# Patient Record
Sex: MALE | Race: WHITE | NOT HISPANIC OR LATINO | Employment: OTHER | URBAN - METROPOLITAN AREA
[De-identification: names, ages, dates, MRNs, and addresses within clinical notes are randomized per-mention and may not be internally consistent; named-entity substitution may affect disease eponyms.]

---

## 2019-10-28 ENCOUNTER — OFFICE VISIT (OUTPATIENT)
Dept: UROLOGY | Facility: CLINIC | Age: 79
End: 2019-10-28
Payer: MEDICARE

## 2019-10-28 VITALS
HEIGHT: 72 IN | WEIGHT: 200 LBS | HEART RATE: 79 BPM | DIASTOLIC BLOOD PRESSURE: 76 MMHG | RESPIRATION RATE: 18 BRPM | BODY MASS INDEX: 27.09 KG/M2 | SYSTOLIC BLOOD PRESSURE: 118 MMHG

## 2019-10-28 DIAGNOSIS — N40.1 BPH WITH URINARY OBSTRUCTION: Primary | ICD-10-CM

## 2019-10-28 DIAGNOSIS — N13.8 BPH WITH URINARY OBSTRUCTION: Primary | ICD-10-CM

## 2019-10-28 PROCEDURE — 51798 US URINE CAPACITY MEASURE: CPT | Mod: S$GLB,,, | Performed by: NURSE PRACTITIONER

## 2019-10-28 PROCEDURE — 99213 PR OFFICE/OUTPT VISIT, EST, LEVL III, 20-29 MIN: ICD-10-PCS | Mod: 25,S$GLB,, | Performed by: NURSE PRACTITIONER

## 2019-10-28 PROCEDURE — 99213 OFFICE O/P EST LOW 20 MIN: CPT | Mod: 25,S$GLB,, | Performed by: NURSE PRACTITIONER

## 2019-10-28 PROCEDURE — 51798 POCT BLADDER SCAN: ICD-10-PCS | Mod: S$GLB,,, | Performed by: NURSE PRACTITIONER

## 2019-10-28 RX ORDER — CALCIUM CARBONATE 600 MG
600 TABLET ORAL ONCE
COMMUNITY

## 2019-10-28 RX ORDER — MULTIVITAMIN
1 TABLET ORAL DAILY
COMMUNITY

## 2019-10-28 RX ORDER — TERAZOSIN 5 MG/1
CAPSULE ORAL
COMMUNITY
Start: 2019-09-19

## 2019-10-28 RX ORDER — METOPROLOL SUCCINATE 50 MG/1
50 TABLET, EXTENDED RELEASE ORAL DAILY
Refills: 6 | COMMUNITY
Start: 2019-10-02 | End: 2022-03-29

## 2019-10-28 RX ORDER — KETOCONAZOLE 20 MG/ML
SHAMPOO, SUSPENSION TOPICAL
COMMUNITY
Start: 2019-09-09

## 2019-10-28 RX ORDER — VALACYCLOVIR HYDROCHLORIDE 1 G/1
1000 TABLET, FILM COATED ORAL 3 TIMES DAILY
Refills: 0 | COMMUNITY
Start: 2019-09-16

## 2019-10-28 RX ORDER — PREDNISOLONE ACETATE 10 MG/ML
SUSPENSION/ DROPS OPHTHALMIC
Refills: 6 | COMMUNITY
Start: 2019-10-03 | End: 2023-07-11

## 2019-10-28 RX ORDER — DIPHENOXYLATE HYDROCHLORIDE AND ATROPINE SULFATE 2.5; .025 MG/1; MG/1
TABLET ORAL
COMMUNITY
Start: 2019-10-10

## 2019-10-28 RX ORDER — ATORVASTATIN CALCIUM 40 MG/1
TABLET, FILM COATED ORAL
COMMUNITY
Start: 2019-07-24

## 2019-10-28 RX ORDER — LATANOPROST 50 UG/ML
SOLUTION/ DROPS OPHTHALMIC
COMMUNITY
Start: 2019-09-06

## 2019-10-28 RX ORDER — ZOLPIDEM TARTRATE 5 MG/1
TABLET ORAL
COMMUNITY
Start: 2019-08-12 | End: 2022-03-29

## 2019-10-28 RX ORDER — LORATADINE 10 MG/1
10 TABLET ORAL DAILY
COMMUNITY

## 2019-10-28 NOTE — PROGRESS NOTES
Subjective:       Patient ID: Negra Casas is a 79 y.o. male.    Chief Complaint: Annual Exam (yrly) and Other (PSA (, pcp): 0.67)      HPI: 79-year-old male presents for yearly re-evaluation.  Patient has a history of BPH with obstruction.  Patient is currently on terazosin/Hytrin 5 mg, and states he is doing well.  Patient denies any pain or burning with urination.  States he has a good stream.  Does not see blood in his urine.  Denies any other urinary complaints.  All other health problems are stable at this time.      Past Medical History:   Past Medical History:   Diagnosis Date    HTN (hypertension)     Hyperlipidemia        Past Surgical Historical:   Past Surgical History:   Procedure Laterality Date    COLECTOMY      HERNIA REPAIR          Medications:   Medication List with Changes/Refills   Current Medications    ATORVASTATIN (LIPITOR) 40 MG TABLET        CALCIUM CARBONATE (OS-CHICHO) 600 MG CALCIUM (1,500 MG) TAB    Take 600 mg by mouth once.    DIPHENOXYLATE-ATROPINE 2.5-0.025 MG (LOMOTIL) 2.5-0.025 MG PER TABLET        KETOCONAZOLE (NIZORAL) 2 % SHAMPOO        LATANOPROST 0.005 % OPHTHALMIC SOLUTION        LORATADINE (CLARITIN) 10 MG TABLET    Take 10 mg by mouth once daily.    MELATONIN ORAL    Take by mouth.    METOPROLOL SUCCINATE (TOPROL-XL) 50 MG 24 HR TABLET    Take 50 mg by mouth once daily.    MULTIVITAMIN (ONE DAILY MULTIVITAMIN) PER TABLET    Take 1 tablet by mouth once daily.    PRED FORTE 1 % DRPS    INSTILL 1 DROP INTO LEFT EYE 4 TIMES DAILY    TERAZOSIN (HYTRIN) 5 MG CAPSULE        VALACYCLOVIR (VALTREX) 1000 MG TABLET    Take 1,000 mg by mouth 3 (three) times daily.    ZOLPIDEM (AMBIEN) 5 MG TAB            Past Social History:   Social History     Socioeconomic History    Marital status:      Spouse name: Not on file    Number of children: Not on file    Years of education: Not on file    Highest education level: Not on file   Occupational History    Not on file    Social Needs    Financial resource strain: Not on file    Food insecurity:     Worry: Not on file     Inability: Not on file    Transportation needs:     Medical: Not on file     Non-medical: Not on file   Tobacco Use    Smoking status: Never Smoker   Substance and Sexual Activity    Alcohol use: Not Currently    Drug use: Not on file    Sexual activity: Not on file   Lifestyle    Physical activity:     Days per week: Not on file     Minutes per session: Not on file    Stress: Not on file   Relationships    Social connections:     Talks on phone: Not on file     Gets together: Not on file     Attends Yazdanism service: Not on file     Active member of club or organization: Not on file     Attends meetings of clubs or organizations: Not on file     Relationship status: Not on file   Other Topics Concern    Not on file   Social History Narrative    Not on file       Allergies: Review of patient's allergies indicates:  No Known Allergies     Family History:   Family History   Problem Relation Age of Onset    Glaucoma Mother         Review of Systems:  Review of Systems   Constitutional: Negative for activity change and appetite change.   HENT: Negative for congestion and dental problem.    Respiratory: Negative for chest tightness and shortness of breath.    Cardiovascular: Negative for chest pain.   Gastrointestinal: Negative for abdominal distention and abdominal pain.   Genitourinary: Negative for decreased urine volume, difficulty urinating, discharge, dysuria, enuresis, flank pain, frequency, genital sores, hematuria, penile pain, penile swelling, scrotal swelling, testicular pain and urgency.   Musculoskeletal: Negative for back pain and neck pain.   Neurological: Negative for dizziness.   Hematological: Negative for adenopathy.   Psychiatric/Behavioral: Negative for agitation, behavioral problems and confusion.       Physical Exam:  Physical Exam   Nursing note and vitals  reviewed.  Constitutional: He is oriented to person, place, and time. He appears well-developed and well-nourished.   HENT:   Head: Normocephalic.   Cardiovascular: Normal rate, regular rhythm and normal heart sounds.    Pulmonary/Chest: Effort normal and breath sounds normal.   Abdominal: Soft. Bowel sounds are normal.   Genitourinary: Prostate normal.   Neurological: He is alert and oriented to person, place, and time.   Skin: Skin is warm and dry.      Outside PSA:  0.67    Assessment/Plan:   BPH with obstruction:  PSA checked by his primary care doctor on a course:  The patient was to 0.67.  TAMIKO today was benign.  No intervention needed.  Will plan follow-up in 1 year, sooner if needed  Problem List Items Addressed This Visit     None      Visit Diagnoses     BPH with urinary obstruction    -  Primary    Relevant Orders    POCT Bladder Scan

## 2019-11-07 LAB — POC RESIDUAL URINE VOLUME: 0 ML (ref 0–100)

## 2022-03-29 VITALS — BODY MASS INDEX: 26.41 KG/M2 | HEIGHT: 72 IN | WEIGHT: 195 LBS

## 2022-03-29 DIAGNOSIS — Z85.038 PERSONAL HISTORY OF COLON CANCER: Primary | ICD-10-CM

## 2022-03-29 DIAGNOSIS — Z86.010 HISTORY OF COLON POLYPS: ICD-10-CM

## 2022-03-29 RX ORDER — SOD SULF/POT CHLORIDE/MAG SULF 1.479 G
12 TABLET ORAL DAILY
Qty: 24 TABLET | Refills: 0 | Status: SHIPPED | OUTPATIENT
Start: 2022-03-29 | End: 2022-04-11

## 2022-03-29 RX ORDER — FLUTICASONE PROPIONATE AND SALMETEROL 50; 250 UG/1; UG/1
POWDER RESPIRATORY (INHALATION)
COMMUNITY
Start: 2021-12-09

## 2022-03-29 RX ORDER — FLUOROMETHOLONE 1 MG/ML
SUSPENSION/ DROPS OPHTHALMIC
COMMUNITY
Start: 2022-02-23 | End: 2023-07-10

## 2022-03-29 RX ORDER — ZOLPIDEM TARTRATE 10 MG/1
10 TABLET ORAL NIGHTLY
COMMUNITY
Start: 2022-01-07

## 2022-03-29 RX ORDER — METOPROLOL SUCCINATE 100 MG/1
1 TABLET, EXTENDED RELEASE ORAL DAILY
COMMUNITY
Start: 2022-03-04

## 2022-03-29 RX ORDER — AZELASTINE 1 MG/ML
1 SPRAY, METERED NASAL 2 TIMES DAILY
COMMUNITY
Start: 2022-01-16

## 2022-03-29 NOTE — TELEPHONE ENCOUNTER
----- Message from Imelda Pineda MA sent at 3/28/2022  1:47 PM CDT -----  Regarding: FW: appt access  Contact: pt    ----- Message -----  From: Indira Echols  Sent: 3/28/2022   1:19 PM CDT  To: Rima Verduzco Staff  Subject: appt access                                      Pt is calling to schedule his colonoscopy. Pt would like to schedule on 05/25/22 early morning.  Please call back at [phone]//thank you acc

## 2022-03-30 ENCOUNTER — TELEPHONE (OUTPATIENT)
Dept: GASTROENTEROLOGY | Facility: CLINIC | Age: 82
End: 2022-03-30
Payer: MEDICARE

## 2022-03-30 NOTE — TELEPHONE ENCOUNTER
"[facility]  [address] [place] [address]  Phone: [phone]  Fax: [phone]    History & Physical         Provider: Dr. Dax Abarca    Patient Name: Negra BLACK (age):1940  81 y.o.           Gender: male   Phone: [phone]     Referring Physician: Marino Baptiste     Vital Signs:   6'0"  195 lbs     Plan: Colonoscopy    Diagnosis:  Z85.038 Personal h/o colon cancer  Z86.010 History of colon polyps      History:      Past Medical History:   Diagnosis Date    Allergic rhinitis     Asthma     Colon cancer     Dyslipidemia     Glaucoma     HTN (hypertension)     Hyperlipidemia     Insomnia     Paroxysmal supraventricular tachycardia     Personal history of colonic polyps       Past Surgical History:   Procedure Laterality Date    CARDIAC ELECTROPHYSIOLOGY MAPPING AND ABLATION  2020    CATARACT EXTRACTION      COLECTOMY      CORNEAL TRANSPLANT      HERNIA REPAIR        Medication List with Changes/Refills   Current Medications    ADVAIR DISKUS 250-50 MCG/DOSE DISKUS INHALER    INHALE 1 DOSE BY MOUTH TWICE DAILY AS DIRECTED    ATORVASTATIN (LIPITOR) 40 MG TABLET        AZELASTINE (ASTELIN) 137 MCG (0.1 %) NASAL SPRAY    1 spray by Nasal route 2 (two) times a day.    CALCIUM CARBONATE (OS-CHICHO) 600 MG CALCIUM (1,500 MG) TAB    Take 600 mg by mouth once.    DIPHENOXYLATE-ATROPINE 2.5-0.025 MG (LOMOTIL) 2.5-0.025 MG PER TABLET        FLUOROMETHOLONE 0.1% (FML) 0.1 % DRPS    INSTILL 1 DROP INTO LEFT EYE TWICE DAILY    KETOCONAZOLE (NIZORAL) 2 % SHAMPOO        LATANOPROST 0.005 % OPHTHALMIC SOLUTION        LORATADINE (CLARITIN) 10 MG TABLET    Take 10 mg by mouth once daily.    MELATONIN ORAL    Take by mouth.    METOPROLOL SUCCINATE (TOPROL-XL) 100 MG 24 HR TABLET    Take 1 tablet by mouth Daily.    MULTIVITAMIN (THERAGRAN) PER TABLET    Take 1 tablet by mouth once " daily.    PRED FORTE 1 % DRPS    INSTILL 1 DROP INTO LEFT EYE 4 TIMES DAILY    SOD SULF-POT CHLORIDE-MAG SULF (SUTAB) 1.479-0.188- 0.225 GRAM TABLET    Take 12 tablets by mouth once daily. Take according to package instructions with indicated amount of water. No breakfast day before test.    TERAZOSIN (HYTRIN) 5 MG CAPSULE        VALACYCLOVIR (VALTREX) 1000 MG TABLET    Take 1,000 mg by mouth 3 (three) times daily.    ZOLPIDEM (AMBIEN) 10 MG TAB    Take 10 mg by mouth nightly.      Review of patient's allergies indicates:  No Known Allergies   Family History   Problem Relation Age of Onset    Glaucoma Mother       Social History     Tobacco Use    Smoking status: Never Smoker    Smokeless tobacco: Never Used   Substance Use Topics    Alcohol use: Not Currently    Drug use: Never        Physical Examination:     General Appearance:___________________________  HEENT: _____________________________________  Abdomen:____________________________________  Heart:________________________________________  Lungs:_______________________________________  Extremities:___________________________________  Skin:_________________________________________  Endocrine:____________________________________  Genitourinary:_________________________________  Neurological:__________________________________      Patient has been evaluated immediately prior to sedationa dn is medically cleared for endoscopy with IVCS as an ASA class: ______      Physician Signature: _________________________       Date: ________  Time: ________

## 2022-04-08 ENCOUNTER — TELEPHONE (OUTPATIENT)
Dept: GASTROENTEROLOGY | Facility: CLINIC | Age: 82
End: 2022-04-08
Payer: MEDICARE

## 2022-04-08 NOTE — TELEPHONE ENCOUNTER
----- Message from Imelda Pineda MA sent at 3/28/2022  1:47 PM CDT -----  Regarding: FW: appt access  Contact: pt    ----- Message -----  From: Indira Echols  Sent: 3/28/2022   1:19 PM CDT  To: Rima Verduzco Staff  Subject: appt access                                      Pt is calling to schedule his colonoscopy. Pt would like to schedule on 05/25/22 early morning.  Please call back at [phone]//thank you acc

## 2022-06-07 ENCOUNTER — OUTSIDE PLACE OF SERVICE (OUTPATIENT)
Dept: GASTROENTEROLOGY | Facility: CLINIC | Age: 82
End: 2022-06-07
Payer: MEDICARE

## 2022-06-07 LAB — CRC RECOMMENDATION EXT: NORMAL

## 2022-06-07 PROCEDURE — 45385 PR COLONOSCOPY,REMV LESN,SNARE: ICD-10-PCS | Mod: PT,,, | Performed by: INTERNAL MEDICINE

## 2022-06-07 PROCEDURE — 45385 COLONOSCOPY W/LESION REMOVAL: CPT | Mod: PT,,, | Performed by: INTERNAL MEDICINE

## 2022-11-14 ENCOUNTER — DOCUMENTATION ONLY (OUTPATIENT)
Dept: GASTROENTEROLOGY | Facility: CLINIC | Age: 82
End: 2022-11-14
Payer: MEDICARE

## 2023-06-19 ENCOUNTER — TELEPHONE (OUTPATIENT)
Dept: GASTROENTEROLOGY | Facility: CLINIC | Age: 83
End: 2023-06-19
Payer: MEDICARE

## 2023-06-19 NOTE — TELEPHONE ENCOUNTER
I spoke to patient and he states he wants to be set up for his routine yearly Colonoscopy.  Does he need to be seen first?  He does see Dr soler for his heart.

## 2023-06-19 NOTE — TELEPHONE ENCOUNTER
----- Message from Nicole Medley sent at 6/19/2023 10:05 AM CDT -----  Contact: Negra Omer needs a call back at [phone], Regards to he is still waiting on a call back to schedule his colonoscopy.    Thanks  Td

## 2023-06-20 NOTE — TELEPHONE ENCOUNTER
Dr Abarca said he needs to be seen bedore he can be set up for his Colonoscopy.  He can be seen by the NP.

## 2023-07-10 NOTE — PROGRESS NOTES
Clinic Note    Reason for visit:  The primary encounter diagnosis was History of colon polyps. A diagnosis of History of colon cancer was also pertinent to this visit.    PCP: Marino Baptiste (Inactive)       HPI:  This is a 82 y.o. male here for repeat Colonoscopy. He has hx of Colon Ca s/p resection in 2008, hx colon polyps. He takes lomotil daily for chronic diarrhea. He has been this way since his colectomy. Denies rectal bleeding.     Colonoscopy 6/7/2022Nodular tissue-8mm in rectum. A piece-meal polypectomy was performed using a hot snare. Polyp tissue was benign tissue-repeat COLON in one yr.      Review of Systems   Constitutional:  Negative for diaphoresis, fatigue, fever and unexpected weight change.   HENT:  Negative for hearing loss, mouth sores, postnasal drip, sore throat and trouble swallowing.    Eyes:  Negative for pain, discharge and eye dryness.   Respiratory:  Negative for apnea, cough, choking, chest tightness, shortness of breath and wheezing.    Cardiovascular:  Negative for chest pain, palpitations and leg swelling.   Gastrointestinal:  Negative for abdominal distention, abdominal pain, anal bleeding, blood in stool, change in bowel habit, constipation, diarrhea, nausea, rectal pain, vomiting, reflux, fecal incontinence and change in bowel habit.   Genitourinary:  Negative for bladder incontinence, dysuria and hematuria.   Musculoskeletal:  Negative for arthralgias, back pain and joint swelling.   Integumentary:  Negative for color change and rash.   Allergic/Immunologic: Negative for environmental allergies and food allergies.   Neurological:  Negative for seizures and headaches.   Hematological:  Negative for adenopathy. Does not bruise/bleed easily.      Past Medical History:   Diagnosis Date    Allergic rhinitis     Asthma     Colon cancer 1998    Dyslipidemia     Glaucoma     HTN (hypertension)     Hyperlipidemia     Insomnia     Paroxysmal supraventricular tachycardia     Personal  Patient Education        Gout: Care Instructions  Your Care Instructions     Gout is a form of arthritis caused by a buildup of uric acid crystals in a joint. It causes sudden attacks of pain, swelling, redness, and stiffness, usually in one joint, especially the big toe. Gout usually comes on without a cause. But it can be brought on by drinking alcohol (especially beer) or eating seafood and red meat. Taking certain medicines, such as diuretics or aspirin, also can bring on an attack of gout. Taking your medicines as prescribed and following up with your doctor regularly can help you avoid gout attacks in the future. Follow-up care is a key part of your treatment and safety. Be sure to make and go to all appointments, and call your doctor if you are having problems. It's also a good idea to know your test results and keep a list of the medicines you take. How can you care for yourself at home? · If the joint is swollen, put ice or a cold pack on the area for 10 to 20 minutes at a time. Put a thin cloth between the ice and your skin. · Prop up the sore limb on a pillow when you ice it or anytime you sit or lie down during the next 3 days. Try to keep it above the level of your heart. This will help reduce swelling. · Rest sore joints. Avoid activities that put weight or strain on the joints for a few days. Take short rest breaks from your regular activities during the day. · Take your medicines exactly as prescribed. Call your doctor if you think you are having a problem with your medicine. · Take pain medicines exactly as directed. ? If the doctor gave you a prescription medicine for pain, take it as prescribed. ? If you are not taking a prescription pain medicine, ask your doctor if you can take an over-the-counter medicine. · Eat less seafood and red meat. · Check with your doctor before drinking alcohol. · Losing weight, if you are overweight, may help reduce attacks of gout.  But do not go on a \"crash diet. \" Losing a lot of weight in a short amount of time can cause a gout attack. When should you call for help? Call your doctor now or seek immediate medical care if:  · You have a fever. · The joint is so painful you cannot use it. · You have sudden, unexplained swelling, redness, warmth, or severe pain in one or more joints. Watch closely for changes in your health, and be sure to contact your doctor if:  · You have joint pain. · Your symptoms get worse or are not improving after 2 or 3 days. Where can you learn more? Go to [email or web address] [email or web address]@ Networks. org and sign in to your [employer] account. Enter B710 in the [employer] box to learn more about \"Gout: Care Instructions. \"     If you do not have an account, please click on the \"Sign Up Now\" link. Current as of: December 9, 2019               Content Version: 12.5  © 7549-0312 [employer]. Care instructions adapted under license by [facility] ([facility]). If you have questions about a medical condition or this instruction, always ask your healthcare professional. [place] [address] any warranty or liability for your use of this information. Patient Education        Purine-Restricted Diet: Care Instructions  Your Care Instructions     Purines are substances that are found in some foods. Your body turns purines into uric acid. High levels of uric acid can cause gout, which is a form of arthritis that causes pain and inflammation in joints. You may be able to help control the amount of uric acid in your body by limiting high-purine foods in your diet. Follow-up care is a key part of your treatment and safety. Be sure to make and go to all appointments, and call your doctor if you are having problems. It's also a good idea to know your test results and keep a list of the medicines you take. How can you care for yourself at home?   · Plan your meals and snacks around foods that are low in purines and are history of colonic polyps      Past Surgical History:   Procedure Laterality Date    CARDIAC ELECTROPHYSIOLOGY MAPPING AND ABLATION  03/2020    CATARACT EXTRACTION      COLECTOMY      CORNEAL TRANSPLANT      HERNIA REPAIR       Family History   Problem Relation Age of Onset    Glaucoma Mother     No Known Problems Father     No Known Problems Sister     No Known Problems Brother      Social History     Tobacco Use    Smoking status: Never    Smokeless tobacco: Never   Substance Use Topics    Alcohol use: Not Currently    Drug use: Never     Review of patient's allergies indicates:  No Known Allergies   Medication List with Changes/Refills   New Medications    SOD SULF-POT CHLORIDE-MAG SULF (SUTAB) 1.479-0.188- 0.225 GRAM TABLET    Take 12 tablets by mouth once daily. Take according to package instructions with indicated amount of water. No breakfast day before test. May substitute with Suprep, Clenpiq, Plenvu, Moviprep or GoLytely based on Rx plan and patient preference.   Current Medications    ADVAIR DISKUS 250-50 MCG/DOSE DISKUS INHALER    INHALE 1 DOSE BY MOUTH TWICE DAILY AS DIRECTED    ATORVASTATIN (LIPITOR) 40 MG TABLET        AZELASTINE (ASTELIN) 137 MCG (0.1 %) NASAL SPRAY    1 spray by Nasal route 2 (two) times a day.    CALCIUM CARBONATE (OS-CHICHO) 600 MG CALCIUM (1,500 MG) TAB    Take 600 mg by mouth once.    DIPHENOXYLATE-ATROPINE 2.5-0.025 MG (LOMOTIL) 2.5-0.025 MG PER TABLET        KETOCONAZOLE (NIZORAL) 2 % SHAMPOO        LATANOPROST 0.005 % OPHTHALMIC SOLUTION        LORATADINE (CLARITIN) 10 MG TABLET    Take 10 mg by mouth once daily.    MELATONIN ORAL    Take by mouth.    METOPROLOL SUCCINATE (TOPROL-XL) 100 MG 24 HR TABLET    Take 1 tablet by mouth Daily.    MULTIVITAMIN (THERAGRAN) PER TABLET    Take 1 tablet by mouth once daily.    PRED FORTE 1 % DRPS    INSTILL 1 DROP INTO LEFT EYE 4 TIMES DAILY    TERAZOSIN (HYTRIN) 5 MG CAPSULE        VALACYCLOVIR (VALTREX) 1000 MG TABLET    Take 1,000 mg by  "mouth 3 (three) times daily.    ZOLPIDEM (AMBIEN) 10 MG TAB    Take 10 mg by mouth nightly.   Discontinued Medications    FLUOROMETHOLONE 0.1% (FML) 0.1 % DRPS    INSTILL 1 DROP INTO LEFT EYE TWICE DAILY    SUTAB 1.479-0.188- 0.225 GRAM TABLET    TAKE 12 TABLETS BY MOUTH ONCE DAILY. TAKE ACCORDING TO PACKAGE INSTRUCTIONS WITH INDICATED AMOUNT OF WATER. NO BREAKFAST DAY BEFORE TEST         Vital Signs:  /74   Pulse 75   Ht 6' 1" (1.854 m)   Wt 92.1 kg (203 lb)   SpO2 95%   BMI 26.78 kg/m²        Physical Exam  Constitutional:       General: He is not in acute distress.     Appearance: Normal appearance. He is well-developed. He is not ill-appearing or toxic-appearing.   HENT:      Head: Normocephalic and atraumatic.      Nose: Nose normal.      Mouth/Throat:      Mouth: Mucous membranes are moist.      Pharynx: Oropharynx is clear. No oropharyngeal exudate or posterior oropharyngeal erythema.   Eyes:      General: Lids are normal. No scleral icterus.        Right eye: No discharge.         Left eye: No discharge.      Conjunctiva/sclera: Conjunctivae normal.   Cardiovascular:      Rate and Rhythm: Normal rate and regular rhythm.      Pulses:           Radial pulses are 2+ on the right side and 2+ on the left side.   Pulmonary:      Effort: Pulmonary effort is normal. No respiratory distress.      Breath sounds: No stridor. No wheezing.   Abdominal:      General: Bowel sounds are normal. There is no distension.      Palpations: Abdomen is soft. There is no fluid wave, hepatomegaly, splenomegaly or mass.      Tenderness: There is no abdominal tenderness. There is no guarding or rebound.   Musculoskeletal:      Cervical back: Full passive range of motion without pain.   Lymphadenopathy:      Cervical: No cervical adenopathy.   Skin:     General: Skin is warm and dry.      Capillary Refill: Capillary refill takes less than 2 seconds.      Coloration: Skin is not cyanotic, jaundiced or pale.   Neurological:     " safe for you to eat. These foods include:  ? Green vegetables and tomatoes. ? Fruits. ? Whole-grain breads, rice, and cereals. ? Eggs, peanut butter, and nuts. ? Low-fat milk, cheese, and other milk products. ? Popcorn. ? Gelatin desserts, chocolate, cocoa, and cakes and sweets, in small amounts. · You can eat certain foods that are medium-high in purines, but eat them only once in a while. These foods include:  ? Legumes, such as dried beans and dried peas. You can have 1 cup cooked legumes each day. ? Asparagus, cauliflower, spinach, mushrooms, and green peas. ? Fish and seafood (other than very high-purine seafood). ? Oatmeal, wheat bran, and wheat germ. · Limit very high-purine foods, including:  ? Organ meats, such as liver, kidneys, sweetbreads, and brains. ? Meats, including hung, beef, pork, and lamb. ? Game meats and any other meats in large amounts. ? Anchovies, sardines, herring, mackerel, and scallops. ? Gravy. ? Beer. Where can you learn more? Go to https://Simparelpe"[employer]".[employer]. org and sign in to your [employer] account. Enter F448 in the [facility] box to learn more about \"Purine-Restricted Diet: Care Instructions. \"     If you do not have an account, please click on the \"Sign Up Now\" link. Current as of: August 22, 2019               Content Version: 12.5  © 6816-6784 [employer]. Care instructions adapted under license by [facility] ([facility]). If you have questions about a medical condition or this instruction, always ask your healthcare professional. [address] any warranty or liability for your use of this information.  General: No focal deficit present.      Mental Status: He is alert and oriented to person, place, and time.   Psychiatric:         Mood and Affect: Mood normal.         Behavior: Behavior is cooperative.          All of the data above and below has been reviewed by myself and any further interpretations will be reflected in the assessment and plan.   The data includes review of external notes, and independent interpretation of lab results, procedures, x-rays, and imaging reports.      Assessment:  History of colon polyps  -     Ambulatory Referral to External Surgery  -     sod sulf-pot chloride-mag sulf (SUTAB) 1.479-0.188- 0.225 gram tablet; Take 12 tablets by mouth once daily. Take according to package instructions with indicated amount of water. No breakfast day before test. May substitute with Suprep, Clenpiq, Plenvu, Moviprep or GoLytely based on Rx plan and patient preference.  Dispense: 24 tablet; Refill: 0    History of colon cancer  Comments:  s/p resection 1998  Orders:  -     Ambulatory Referral to External Surgery  -     sod sulf-pot chloride-mag sulf (SUTAB) 1.479-0.188- 0.225 gram tablet; Take 12 tablets by mouth once daily. Take according to package instructions with indicated amount of water. No breakfast day before test. May substitute with Suprep, Clenpiq, Plenvu, Moviprep or GoLytely based on Rx plan and patient preference.  Dispense: 24 tablet; Refill: 0         Recommendations:    Schedule for Colonoscopy at [facility] with Dr. Abarca with Sutab      Risks, benefits, and alternatives of medical management, any associated procedures, and/or treatment discussed with the patient. Patient given opportunity to ask questions and voices understanding. Patient has elected to proceed with the recommended care modalities as discussed.    Follow up if symptoms worsen or fail to improve.    Order summary:  Orders Placed This Encounter   Procedures    Ambulatory Referral to External Surgery        Instructed  patient to notify my office if they have not been contacted within two weeks after any procedures, submitting any samples (biopsies, blood, stool, urine, etc.) or after any imaging (X-ray, CT, MRI, etc.).      Samantha Pond NP    This document may have been created using a voice recognition transcribing system. Incorrect words or phrases may have been missed during proofreading. Please interpret accordingly or contact me for clarification.

## 2023-07-11 ENCOUNTER — OFFICE VISIT (OUTPATIENT)
Dept: GASTROENTEROLOGY | Facility: CLINIC | Age: 83
End: 2023-07-11
Payer: MEDICARE

## 2023-07-11 VITALS
SYSTOLIC BLOOD PRESSURE: 136 MMHG | WEIGHT: 203 LBS | HEIGHT: 73 IN | BODY MASS INDEX: 26.9 KG/M2 | HEART RATE: 75 BPM | OXYGEN SATURATION: 95 % | DIASTOLIC BLOOD PRESSURE: 74 MMHG

## 2023-07-11 DIAGNOSIS — Z86.010 HISTORY OF COLON POLYPS: Primary | ICD-10-CM

## 2023-07-11 DIAGNOSIS — Z85.038 HISTORY OF COLON CANCER: ICD-10-CM

## 2023-07-11 PROCEDURE — 99499 NO LOS: ICD-10-PCS | Mod: S$GLB,,, | Performed by: NURSE PRACTITIONER

## 2023-07-11 PROCEDURE — 99499 UNLISTED E&M SERVICE: CPT | Mod: S$GLB,,, | Performed by: NURSE PRACTITIONER

## 2023-07-11 RX ORDER — SOD SULF/POT CHLORIDE/MAG SULF 1.479 G
12 TABLET ORAL DAILY
Qty: 24 TABLET | Refills: 0 | Status: SHIPPED | OUTPATIENT
Start: 2023-07-11

## 2023-07-11 NOTE — PATIENT INSTRUCTIONS
Schedule for Colonoscopy    Please notify my office if you have not been contacted within two weeks after any procedures, submitting any samples (biopsies, blood, stool, urine, etc.) or after any imaging (X-ray, CT, MRI, etc.).

## 2023-08-21 ENCOUNTER — TELEPHONE (OUTPATIENT)
Dept: GASTROENTEROLOGY | Facility: CLINIC | Age: 83
End: 2023-08-21
Payer: MEDICARE

## 2023-08-25 DIAGNOSIS — Z86.010 HISTORY OF COLON POLYPS: Primary | ICD-10-CM

## 2023-08-25 DIAGNOSIS — Z85.038 HISTORY OF COLON CANCER: ICD-10-CM

## 2023-08-25 NOTE — PROGRESS NOTES
"[facility]  [address] [place] [address]  Phone: [phone]  Fax: [phone]    History & Physical         Provider: Dr. Dax Abarca    Patient Name: Negra BLACK (age):1940  83 y.o.           Gender: male   Phone: [phone]     Referring Physician: Marino Baptiste (Inactive)     Vital Signs:   Height - 6' 1"  Weight - 203 lbs  BMI -  26.8    Plan: Colonoscopy    Encounter Diagnoses   Name Primary?    History of colon polyps Yes    History of colon cancer            History:      Past Medical History:   Diagnosis Date    Allergic rhinitis     Asthma     Colon cancer     Dyslipidemia     Glaucoma     HTN (hypertension)     Hyperlipidemia     Insomnia     Paroxysmal supraventricular tachycardia     Personal history of colonic polyps       Past Surgical History:   Procedure Laterality Date    CARDIAC ELECTROPHYSIOLOGY MAPPING AND ABLATION  2020    CATARACT EXTRACTION      COLECTOMY      CORNEAL TRANSPLANT      HERNIA REPAIR        Medication List with Changes/Refills   Current Medications    ADVAIR DISKUS 250-50 MCG/DOSE DISKUS INHALER    INHALE 1 DOSE BY MOUTH TWICE DAILY AS DIRECTED    ATORVASTATIN (LIPITOR) 40 MG TABLET        AZELASTINE (ASTELIN) 137 MCG (0.1 %) NASAL SPRAY    1 spray by Nasal route 2 (two) times a day.    CALCIUM CARBONATE (OS-CHICHO) 600 MG CALCIUM (1,500 MG) TAB    Take 600 mg by mouth once.    DIPHENOXYLATE-ATROPINE 2.5-0.025 MG (LOMOTIL) 2.5-0.025 MG PER TABLET        KETOCONAZOLE (NIZORAL) 2 % SHAMPOO        LATANOPROST 0.005 % OPHTHALMIC SOLUTION        LORATADINE (CLARITIN) 10 MG TABLET    Take 10 mg by mouth once daily.    MELATONIN ORAL    Take by mouth.    METOPROLOL SUCCINATE (TOPROL-XL) 100 MG 24 HR TABLET    Take 1 tablet by mouth Daily.    MULTIVITAMIN (THERAGRAN) PER TABLET    Take 1 tablet by mouth once daily.    SOD SULF-POT " CHLORIDE-MAG SULF (SUTAB) 1.479-0.188- 0.225 GRAM TABLET    Take 12 tablets by mouth once daily. Take according to package instructions with indicated amount of water. No breakfast day before test. May substitute with Suprep, Clenpiq, Plenvu, Moviprep or GoLytely based on Rx plan and patient preference.    TERAZOSIN (HYTRIN) 5 MG CAPSULE        VALACYCLOVIR (VALTREX) 1000 MG TABLET    Take 1,000 mg by mouth 3 (three) times daily.    ZOLPIDEM (AMBIEN) 10 MG TAB    Take 10 mg by mouth nightly.      Review of patient's allergies indicates:  No Known Allergies   Family History   Problem Relation Age of Onset    Glaucoma Mother     No Known Problems Father     No Known Problems Sister     No Known Problems Brother       Social History     Tobacco Use    Smoking status: Never    Smokeless tobacco: Never   Substance Use Topics    Alcohol use: Not Currently    Drug use: Never        Physical Examination:     General Appearance:___________________________  HEENT: _____________________________________  Abdomen:____________________________________  Heart:________________________________________  Lungs:_______________________________________  Extremities:___________________________________  Skin:_________________________________________  Endocrine:____________________________________  Genitourinary:_________________________________  Neurological:__________________________________      Patient has been evaluated immediately prior to sedation and is medically cleared for endoscopy with IVCS as an ASA class: ______      Physician Signature: _________________________       Date: ________  Time: ________

## 2023-08-30 ENCOUNTER — OUTSIDE PLACE OF SERVICE (OUTPATIENT)
Dept: GASTROENTEROLOGY | Facility: CLINIC | Age: 83
End: 2023-08-30

## 2023-08-30 LAB — CRC RECOMMENDATION EXT: NORMAL

## 2023-08-30 PROCEDURE — 45385 PR COLONOSCOPY,REMV LESN,SNARE: ICD-10-PCS | Mod: PT,,, | Performed by: INTERNAL MEDICINE

## 2023-08-30 PROCEDURE — 45385 COLONOSCOPY W/LESION REMOVAL: CPT | Mod: PT,,, | Performed by: INTERNAL MEDICINE

## 2023-08-31 LAB — SPECIMEN TO PATHOLOGY: NORMAL

## 2023-09-06 ENCOUNTER — TELEPHONE (OUTPATIENT)
Dept: GASTROENTEROLOGY | Facility: CLINIC | Age: 83
End: 2023-09-06
Payer: MEDICARE

## 2023-09-06 NOTE — TELEPHONE ENCOUNTER
Results discussed with patient per Dr. Abarca's chart remarks. Patient voices understanding/agreement. -Rhona ORTEGA CMA

## 2023-09-06 NOTE — TELEPHONE ENCOUNTER
----- Message from Dax Abarca MD sent at 9/6/2023  1:55 PM CDT -----  Call with results,polyps ok-repeat colon in 3 yrs.